# Patient Record
Sex: FEMALE | Race: BLACK OR AFRICAN AMERICAN | NOT HISPANIC OR LATINO | Employment: FULL TIME | RURAL
[De-identification: names, ages, dates, MRNs, and addresses within clinical notes are randomized per-mention and may not be internally consistent; named-entity substitution may affect disease eponyms.]

---

## 2017-03-30 ENCOUNTER — OFFICE VISIT (OUTPATIENT)
Dept: FAMILY MEDICINE CLINIC | Facility: CLINIC | Age: 52
End: 2017-03-30

## 2017-03-30 VITALS
TEMPERATURE: 97.5 F | RESPIRATION RATE: 16 BRPM | OXYGEN SATURATION: 97 % | WEIGHT: 194.5 LBS | HEIGHT: 67 IN | DIASTOLIC BLOOD PRESSURE: 72 MMHG | SYSTOLIC BLOOD PRESSURE: 114 MMHG | BODY MASS INDEX: 30.53 KG/M2 | HEART RATE: 65 BPM

## 2017-03-30 DIAGNOSIS — Z23 IMMUNIZATION DUE: ICD-10-CM

## 2017-03-30 DIAGNOSIS — D50.9 IRON DEFICIENCY ANEMIA, UNSPECIFIED IRON DEFICIENCY ANEMIA TYPE: ICD-10-CM

## 2017-03-30 DIAGNOSIS — M54.2 NECK PAIN: Primary | ICD-10-CM

## 2017-03-30 PROCEDURE — 99214 OFFICE O/P EST MOD 30 MIN: CPT | Performed by: NURSE PRACTITIONER

## 2017-03-30 PROCEDURE — 90471 IMMUNIZATION ADMIN: CPT | Performed by: NURSE PRACTITIONER

## 2017-03-30 PROCEDURE — 90715 TDAP VACCINE 7 YRS/> IM: CPT | Performed by: NURSE PRACTITIONER

## 2017-03-30 RX ORDER — CYCLOBENZAPRINE HCL 5 MG
5-10 TABLET ORAL 3 TIMES DAILY PRN
Qty: 30 TABLET | Refills: 3 | Status: SHIPPED | OUTPATIENT
Start: 2017-03-30

## 2017-03-30 RX ORDER — FERROUS SULFATE 325(65) MG
325 TABLET ORAL DAILY
Qty: 30 TABLET | Refills: 5 | Status: SHIPPED | OUTPATIENT
Start: 2017-03-30

## 2017-03-30 RX ORDER — DICLOFENAC SODIUM 75 MG/1
75 TABLET, DELAYED RELEASE ORAL 2 TIMES DAILY
Qty: 60 TABLET | Refills: 5 | Status: SHIPPED | OUTPATIENT
Start: 2017-03-30

## 2017-03-31 NOTE — PROGRESS NOTES
Subjective   Kenisha Barnard is a 51 y.o. female.     HPI Comments: Here today for amanda.  She has chronic pain in her neck and shoulders.  Has been several months since she was here last.  She says the meds she was placed on prev gave her about 4 months of relief.  However she ran out of meds.  Discomfort is worse on the right neck area and down her right shoulder.    Pain   This is a chronic problem. The current episode started more than 1 year ago. The problem occurs intermittently. The problem has been waxing and waning. Associated symptoms include myalgias and neck pain. Pertinent negatives include no abdominal pain, anorexia, arthralgias, change in bowel habit, chest pain, chills, congestion, coughing, diaphoresis, fatigue, fever, headaches, joint swelling, nausea, numbness, rash, sore throat, swollen glands, urinary symptoms, vertigo, visual change, vomiting or weakness. Exacerbated by: lifting and stretching. She has tried NSAIDs (muscle relaxers) for the symptoms. The treatment provided significant relief.        The following portions of the patient's history were reviewed and updated as appropriate: allergies, current medications, past family history, past medical history, past social history, past surgical history and problem list.    Review of Systems   Constitutional: Negative.  Negative for chills, diaphoresis, fatigue and fever.   HENT: Negative.  Negative for congestion and sore throat.    Respiratory: Negative.  Negative for cough.    Cardiovascular: Negative.  Negative for chest pain.   Gastrointestinal: Negative for abdominal pain, anorexia, change in bowel habit, nausea and vomiting.   Musculoskeletal: Positive for myalgias and neck pain. Negative for arthralgias and joint swelling.   Skin: Negative.  Negative for rash.   Neurological: Negative.  Negative for vertigo, weakness, numbness and headaches.   Psychiatric/Behavioral: Negative.        Objective   Physical Exam   Constitutional: She is  oriented to person, place, and time. She appears well-developed and well-nourished. No distress.   HENT:   Head: Normocephalic.   Eyes: Pupils are equal, round, and reactive to light.   Neck: Normal range of motion. Neck supple. No thyromegaly present.   Cardiovascular: Normal rate, regular rhythm and normal heart sounds.  Exam reveals no friction rub.    No murmur heard.  Pulmonary/Chest: Effort normal and breath sounds normal. No respiratory distress. She has no wheezes. She has no rales.   Abdominal: Soft.   Musculoskeletal: Normal range of motion.        Right shoulder: She exhibits tenderness. She exhibits normal range of motion.        Cervical back: She exhibits tenderness. She exhibits normal range of motion.   Neurological: She is alert and oriented to person, place, and time.   Skin: Skin is warm and dry.   Psychiatric: She has a normal mood and affect. Thought content normal.   Nursing note and vitals reviewed.      Assessment/Plan   Kenisha was seen today for follow-up and pain.    Diagnoses and all orders for this visit:    Neck pain  -     diclofenac (VOLTAREN) 75 MG EC tablet; Take 1 tablet by mouth 2 (Two) Times a Day.  -     cyclobenzaprine (FLEXERIL) 5 MG tablet; Take 1-2 tablets by mouth 3 (Three) Times a Day As Needed for Muscle Spasms (Take for neck pain.).    Iron deficiency anemia, unspecified iron deficiency anemia type  -     ferrous sulfate 325 (65 FE) MG tablet; Take 1 tablet by mouth Daily.    Immunization due  -     Tdap Vaccine Greater Than or Equal To 8yo IM    have reviewed last x rays which showed very mild degenerative changes.  Also have refilled her nsaid and muscle relaxer.  Have offered cortisone, she has refused.

## 2018-07-26 ENCOUNTER — TRANSCRIBE ORDERS (OUTPATIENT)
Dept: PHYSICAL THERAPY | Facility: HOSPITAL | Age: 53
End: 2018-07-26

## 2018-07-26 DIAGNOSIS — M54.50 RIGHT-SIDED LOW BACK PAIN WITHOUT SCIATICA, UNSPECIFIED CHRONICITY: Primary | ICD-10-CM

## 2018-08-13 ENCOUNTER — HOSPITAL ENCOUNTER (OUTPATIENT)
Dept: PHYSICAL THERAPY | Facility: HOSPITAL | Age: 53
Setting detail: THERAPIES SERIES
Discharge: HOME | End: 2018-08-13

## 2018-08-13 DIAGNOSIS — M54.5 RIGHT LOW BACK PAIN, UNSPECIFIED CHRONICITY, WITH SCIATICA PRESENCE UNSPECIFIED: Primary | ICD-10-CM

## 2018-08-13 PROCEDURE — 97162 PT EVAL MOD COMPLEX 30 MIN: CPT | Performed by: PHYSICAL THERAPIST

## 2018-08-13 NOTE — THERAPY EVALUATION
Outpatient Physical Therapy Ortho Initial Evaluation  [facility]  Анна Roberts, PT, DPT, CSCS       Patient Name: Kenisha Barnard  : 1965  MRN: [id number]  Today's Date: 2018      Visit Date: 2018     Pt reports 0/10 pain pre treatment, 0/10 pain post treatment  Reports N/A% of improvement.  Attended  visits.  Insurance available: Pending approval  Next MD appt: TBD .  Recertification: 2018.    Patient Active Problem List   Diagnosis   • Iron deficiency anemia   • Neck pain        Past Medical History:   Diagnosis Date   • Allergic rhinitis    • Anemia    • Arthritis    • Constipation    • Headache    • Low back pain    • Malaise and fatigue    • Neck pain    • Urinary tract infectious disease         Past Surgical History:   Procedure Laterality Date   • TUBAL ABDOMINAL LIGATION         Visit Dx:     ICD-10-CM ICD-9-CM   1. Right low back pain, unspecified chronicity, with sciatica presence unspecified M54.5 724.2     Number of days off work: None    Patient is .    Medications: Escitalopram 10mg    Allergies: Lortab          Patient History     Row Name 18 1400             History    Chief Complaint Pain  -AJ      Type of Pain Back pain  -AJ      Date Current Problem(s) Began --   Chronic  -AJ      Brief Description of Current Complaint Patient reports she has had trouble with her back on/off. SHe reprots her neck also gives her trouble on/off. She reports on the middle/right sided low back she gets a burning/tingling. She reports BC powder will help soime but only lasts so long. She rpeorts pain comes right back once that wears off. She reports this has been on/off for years. Reports it is come/go but more there than not.  -AJ      Previous treatment for THIS PROBLEM Medication  -AJ      Patient/Caregiver Goals Relieve pain;Know what to do to help the symptoms  -AJ      Current Tobacco Use None  -AJ      Smoking Status  "Non-smoker  -AJ      Patient's Rating of General Health Good  -AJ      Occupation/sports/leisure activities Occupation: [employer], make parts to go in  airplanes; Hobbies: read, bowling, exercise  -AJ      Patient seeing anyone else for problem(s)? No  -AJ      What clinical tests have you had for this problem? X-ray   2014  -AJ      Results of Clinical Tests CONCLUSION- Mild multilevel degenerative arthropathy.  -AJ      History of Previous Related Injuries None  -AJ      Are you or can you be pregnant No  -AJ         Pain     Pain Location Back  -AJ      Pain at Present 0  -AJ      Pain at Best 0  -AJ      Pain at Worst 10  -AJ      Pain Frequency Intermittent  -AJ      Pain Description Burning;Tingling;Numbness  -AJ      What Performance Factors Make the Current Problem(s) WORSE? Stress, being busy  -AJ      What Performance Factors Make the Current Problem(s) BETTER? BC powder  -AJ      Tolerance Time- Standing \"Doesn't seem to bother me\"  -AJ      Tolerance Time- Sitting 1-2 hours  -AJ      Tolerance Time- Walking \"Doesn't seem to bother it\"  -AJ      Is your sleep disturbed? Yes  -AJ      Is medication used to assist with sleep? Yes  -AJ      Difficulties at work? Sometimes, due to prolonged sitting  -AJ      Difficulties with ADL's? None  -AJ      Difficulties with recreational activities? Working out  -AJ        User Key  (r) = Recorded By, (t) = Taken By, (c) = Cosigned By    Initials Name Provider Type    AJ Анна Roberts, PT Physical Therapist                PT Ortho     Row Name 08/13/18 1400       Subjective Comments    Subjective Comments Patient is not sure what she is to get out of PT. She reports she doesn't know what ot expect. She rpeorts she would like to decrease pain.  -AJ       Precautions and Contraindications    Precautions/Limitations no known precautions/limitations  -AJ       Subjective Pain    Able to rate subjective pain? yes  -AJ    Pre-Treatment Pain Level 0  -AJ    " Post-Treatment Pain Level 0  -AJ       Posture/Observations    Alignment Options Cervical lordosis;Forward head;Thoracic kyphosis;Rounded shoulders;Scapular elevation;Scoliosis;Lumbar lordosis;Iliac crests  -AJ    Forward Head Mild;Increased  -AJ    Cervical Lordosis Mild;Increased  -AJ    Thoracic Kyphosis Mild;Increased  -AJ    Rounded Shoulders Bilateral:;Mild;Increased  -AJ    Scapular Elevation Right:;Mild;Elevated;Left:;Normal  -AJ    Scoliosis Normal  -AJ    Lumbar lordosis Normal  -AJ    Iliac crests Bilateral:;Normal   pelvis is level, no LLD ot note.  -AJ    Posture/Observations Comments Fair overll postural awareness with overall decrease in abdominal activity. No acute distress.  -AJ       DTR- Lower Quarter Clearing    Patellar tendon (L2-4) Bilateral:;2- Normal response  -AJ    Achilles tendon (S1-2) Bilateral:;2- Normal response  -AJ       Lumbar/SI Special Tests    Standing Flexion Test (SI Dysfunction) Bilateral:;Negative  -AJ    Stork Test (SI Dysfunction) Bilateral:;Negative  -AJ    Trendelenburg Test (Gluteus Medius Weakness) Bilateral:;Negative  -AJ    Slump Test (Neural Tension) Bilateral:;Negative  -AJ    Melinda Ernesto Test (HNP) Negative  -AJ    SLR (Neural Tension) Bilateral:;Negative  -AJ    SI Compression Test (SI Dysfunction) Bilateral:;Negative  -AJ    SI Distraction Test (SI Dysfunction) Bilateral:;Negative  -AJ    NAVEED (hip vs. SI Dysfunction) Bilateral:;Negative  -AJ    FAIR Test (Piriformis Syndrome) Bilateral:;Negative  -AJ    Sacral Spring Test (SI Dysfunction) Negative  -AJ       Cristiano's Signs    Superficial and non-anatomical tenderness Negative  -AJ    Simulation test Negative  -AJ    Distraction straight leg raise test (sitting vs supine) Negative  -AJ    Regional disturbances Negative  -AJ    Overreaction to examination Negative  -AJ       Head/Neck/Trunk    Trunk Extension AROM 30°  -AJ    Trunk Flexion AROM 110°  -AJ    Trunk Lt Lateral Flexion AROM WNL, fingertips to fibular  head.  -AJ    Trunk Rt Lateral Flexion AROM WNL, fingertips to fibular head.  -AJ    Trunk Lt Rotation AROM 50% of range  -AJ    Trunk Rt Rotation AROM 75% of range  -AJ       MMT (Manual Muscle Testing)    Additional Documentation General Assessment (Manual Muscle Testing) (Group)  -       General Assessment (Manual Muscle Testing)    Comment, General Manual Muscle Testing (MMT) Assessment B LE 5/5, except B hip adduction 4/5  -AJ       Sensation    Sensation WNL? WNL  -AJ    Light Touch No apparent deficits  -AJ    Additional Comments Report son/off burning on R sided LB on/off. Denies any rad pain into buttocks or LEs.  -AJ       Lower Extremity Flexibility    Hamstrings Bilateral:;Mildly limited  -AJ    Hip Flexors Bilateral:;Mildly limited  -AJ    LE Other Flexibility Bilateral:;WNL   piriformis  -AJ       Pathomechanics    Spine Pathomechanics Excessive thoracic kyphosis with forward bend  -AJ       Transfers    Comment (Transfers) I with all ransfers, poor log rol ltechnique.  -AJ       Gait/Stairs Assessment/Training    Comment (Gait/Stairs) FWB, non-antalgic gait, no distress.  -AJ      User Key  (r) = Recorded By, (t) = Taken By, (c) = Cosigned By    Initials Name Provider Type    AJ Анна Roberts, PT Physical Therapist              Therapy Education  Given: HEP, Symptoms/condition management, Pain management, Posture/body mechanics, Mobility training  Program: New  How Provided: Verbal, Demonstration  Provided to: Patient  Level of Understanding: Verbalized, Demonstrated           PT OP Goals     Row Name 08/13/18 1400          PT Short Term Goals    STG Date to Achieve 09/03/18  -AJ     STG 1 I with HEP and have additions/changes by next recertification.  -AJ     STG 2 Patient able ot show a proper log roll technique.  -AJ     STG 3 AROM lumbar ROT >= 75%.  -AJ     STG 4 B hip add 4+/5.  -AJ     STG 5 Patient ot be more aware of posture and posture correcton technique.  -AJ        Long Term  "Goals    LTG Date to Achieve 09/21/18  -     LTG 1 AROm for lumbar spine all WNL< no increase in pain.  -     LTG 2 B LE 5/5.  -     LTG 3 Patient able to perform 20 bridges over pball with UE \"X\" for improved core strength.  -     LTG 4 Patientable to perform seated pabll actiities for 10 minutes with good posture for improved core stability.  -     LTG 5 Patient to understand proper ergonomics for job activities.  -     LTG 6 I with final HEP.  -     LTG 7 D/C with a final HEp and free 30 day fitness formula memebership.  -        Time Calculation    PT Goal Re-Cert Due Date 09/03/18  -       User Key  (r) = Recorded By, (t) = Taken By, (c) = Cosigned By    Initials Name Provider Type    Анна Amanda, PT Physical Therapist         Barriers to Rehab: Include significant or possible arthritic/degenerative changes that have occurred within the spine.    Safety Issues: None noted.            PT Assessment/Plan     Row Name 08/13/18 1400          PT Assessment    Functional Limitations Limitation in home management;Limitations in community activities;Performance in leisure activities;Performance in work activities  -     Impairments Endurance;Impaired flexibility;Impaired muscle endurance;Impaired muscle power;Range of motion;Posture;Poor body mechanics;Pain;Muscle strength;Impaired postural alignment  -     Assessment Comments Patient's insurance requires authorization prior to treatment beginning. patient was issued small HEP exercse program and verbalized undertanding of POC.  -     Rehab Potential Good  -     Patient/caregiver participated in establishment of treatment plan and goals Yes  -     Patient would benefit from skilled therapy intervention Yes  -        PT Plan    PT Frequency 2x/week  -     Predicted Duration of Therapy Intervention (Therapy Eval) 3-5 weeks, 6-10 visits  -     Planned CPT's? PT EVAL MOD COMPLELITY: 25036;PT RE-EVAL: 13919;PT THER PROC EA 15 " "MIN: 69944;PT THER ACT EA 15 MIN: 50911;PT MANUAL THERAPY EA 15 MIN: 20723;PT ELECTRICAL STIM UNATTEND: ;PT THER SUPP EA 15 MIN  -AJ     Physical Therapy Interventions (Optional Details) gross motor skills;home exercise program;lumbar stabilization;manual therapy techniques;patient/family education;postural re-education;ROM (Range of Motion);strengthening;stretching  -MELVIN     PT Plan Comments Progress overall core stab and posture.  -MELVIN       User Key  (r) = Recorded By, (t) = Taken By, (c) = Cosigned By    Initials Name Provider Type    Анна Amanda, PT Physical Therapist       Other therapeutic activities and/or exercises will be prescribed depending on the patients progress or lack there of.          Modalities     Row Name 08/13/18 1400             Moist Heat    MH S/P Rx No  -AJ         Ice    Ice S/P Rx No  -AJ        User Key  (r) = Recorded By, (t) = Taken By, (c) = Cosigned By    Initials Name Provider Type    Анна Amanda, BAM Physical Therapist              Exercises     Row Name 08/13/18 1400             Subjective Comments    Subjective Comments Patient is not sure what she is to get out of PT. She reports she doesn't know what ot expect. She rpeorts she would like to decrease pain.  -MELVIN         Subjective Pain    Able to rate subjective pain? yes  -AJ      Pre-Treatment Pain Level 0  -AJ      Post-Treatment Pain Level 0  -AJ         Exercise 1    Exercise Name 1 Log roll instruction  -AJ         Exercise 2    Exercise Name 2 Seated ROT S  -AJ      Reps 2 10  -AJ      Time 2 10\" hold  -        User Key  (r) = Recorded By, (t) = Taken By, (c) = Cosigned By    Initials Name Provider Type    Анна Amanda, PT Physical Therapist                        Outcome Measure Options: Prosper Ayers  Modified Oswestry  Modified Oswestry Score/Comments: 11/50 = 22%      Time Calculation:   Start Time: 1358  Stop Time: 1438  Time Calculation (min): 40 min     Therapy Charges " for Today     Code Description Service Date Service Provider Modifiers Qty    48203443407 HC PT EVAL MOD COMPLEXITY 3 8/13/2018 Анна Roberts, PT GP 1    58624762841 HC PT THER SUPP EA 15 MIN 8/13/2018 Анна Roberts, PT GP 1          PT G-Codes  Outcome Measure Options: Modifed AdinaestrParadise Roberts, PT, DPT, CSCS  8/13/2018

## 2018-12-05 ENCOUNTER — DOCUMENTATION (OUTPATIENT)
Dept: PHYSICAL THERAPY | Facility: HOSPITAL | Age: 53
End: 2018-12-05

## 2018-12-05 DIAGNOSIS — M54.5 RIGHT LOW BACK PAIN, UNSPECIFIED CHRONICITY, WITH SCIATICA PRESENCE UNSPECIFIED: Primary | ICD-10-CM

## 2019-02-18 ENCOUNTER — TRANSCRIBE ORDERS (OUTPATIENT)
Dept: PHYSICAL THERAPY | Facility: HOSPITAL | Age: 54
End: 2019-02-18

## 2019-02-18 DIAGNOSIS — M54.2 NECK PAIN: Primary | ICD-10-CM
